# Patient Record
Sex: FEMALE | Race: ASIAN | NOT HISPANIC OR LATINO | Employment: FULL TIME | URBAN - METROPOLITAN AREA
[De-identification: names, ages, dates, MRNs, and addresses within clinical notes are randomized per-mention and may not be internally consistent; named-entity substitution may affect disease eponyms.]

---

## 2017-03-24 ENCOUNTER — ALLSCRIPTS OFFICE VISIT (OUTPATIENT)
Dept: OTHER | Facility: OTHER | Age: 37
End: 2017-03-24

## 2017-03-30 LAB
A/G RATIO (HISTORICAL): 1.2 (CALC) (ref 1–2.5)
ALBUMIN SERPL BCP-MCNC: 3.8 G/DL (ref 3.6–5.1)
ALP SERPL-CCNC: 109 U/L (ref 33–115)
ALT SERPL W P-5'-P-CCNC: 63 U/L (ref 6–29)
AST SERPL W P-5'-P-CCNC: 51 U/L (ref 10–30)
BILIRUB SERPL-MCNC: 0.6 MG/DL (ref 0.2–1.2)
BUN SERPL-MCNC: 10 MG/DL (ref 7–25)
BUN/CREA RATIO (HISTORICAL): 20 (CALC) (ref 6–22)
CALCIUM (ADJUSTED FOR ALBUMIN) (HISTORICAL): 9.3 MG/DL (CALC) (ref 8.6–10.2)
CALCIUM SERPL-MCNC: 8.9 MG/DL (ref 8.6–10.2)
CHLORIDE SERPL-SCNC: 102 MMOL/L (ref 98–110)
CHOLEST SERPL-MCNC: 249 MG/DL (ref 125–200)
CHOLEST/HDLC SERPL: 6.7 (CALC)
CO2 SERPL-SCNC: 25 MMOL/L (ref 20–31)
CREAT SERPL-MCNC: 0.49 MG/DL (ref 0.5–1.1)
EGFR AFRICAN AMERICAN (HISTORICAL): 146 ML/MIN/1.73M2
EGFR-AMERICAN CALC (HISTORICAL): 126 ML/MIN/1.73M2
EST. AVERAGE GLUCOSE BLD GHB EST-MCNC: 177 (CALC)
EST. AVERAGE GLUCOSE BLD GHB EST-MCNC: 9.8 (CALC)
GAMMA GLOBULIN (HISTORICAL): 3.3 G/DL (CALC) (ref 1.9–3.7)
GLUCOSE (HISTORICAL): 127 MG/DL (ref 65–99)
HBA1C MFR BLD HPLC: 7.8 % OF TOTAL HGB
HDLC SERPL-MCNC: 37 MG/DL
LDL CHOLESTEROL (HISTORICAL): 188 MG/DL (CALC)
NON-HDL-CHOL (CHOL-HDL) (HISTORICAL): 212 MG/DL (CALC)
POTASSIUM SERPL-SCNC: 4.1 MMOL/L (ref 3.5–5.3)
SODIUM SERPL-SCNC: 136 MMOL/L (ref 135–146)
TOTAL PROTEIN (HISTORICAL): 7.1 G/DL (ref 6.1–8.1)
TRIGL SERPL-MCNC: 120 MG/DL
TSH SERPL DL<=0.05 MIU/L-ACNC: 1.15 MIU/L

## 2017-04-02 ENCOUNTER — GENERIC CONVERSION - ENCOUNTER (OUTPATIENT)
Dept: OTHER | Facility: OTHER | Age: 37
End: 2017-04-02

## 2017-04-27 ENCOUNTER — ALLSCRIPTS OFFICE VISIT (OUTPATIENT)
Dept: OTHER | Facility: OTHER | Age: 37
End: 2017-04-27

## 2017-05-02 ENCOUNTER — GENERIC CONVERSION - ENCOUNTER (OUTPATIENT)
Dept: OTHER | Facility: OTHER | Age: 37
End: 2017-05-02

## 2017-05-02 LAB — CLINICAL COMMENT (HISTORICAL): NORMAL

## 2017-05-12 ENCOUNTER — LAB CONVERSION - ENCOUNTER (OUTPATIENT)
Dept: OTHER | Facility: OTHER | Age: 37
End: 2017-05-12

## 2017-05-12 LAB
17-HYDROXYPREGNENOLONE (HISTORICAL): 13 NG/DL
DHEA-SO4 (HISTORICAL): 188 MCG/DL (ref 23–266)
HBA1C MFR BLD HPLC: 6.3 % OF TOTAL HGB
PROLACTIN (HISTORICAL): 4.6 NG/ML
T4 FREE SERPL-MCNC: 1.9 NG/DL (ref 0.8–2.7)
T4 TOTAL (HISTORICAL): 9 MCG/DL (ref 4.8–10.4)
TESTOSTERONE FREE (HISTORICAL): 2.2 PG/ML (ref 0.1–6.4)
TESTOSTERONE TOTAL (HISTORICAL): 16 NG/DL (ref 2–45)
TSH SERPL DL<=0.05 MIU/L-ACNC: 0.95 MIU/L

## 2017-05-15 ENCOUNTER — GENERIC CONVERSION - ENCOUNTER (OUTPATIENT)
Dept: OTHER | Facility: OTHER | Age: 37
End: 2017-05-15

## 2017-06-23 ENCOUNTER — ALLSCRIPTS OFFICE VISIT (OUTPATIENT)
Dept: OTHER | Facility: OTHER | Age: 37
End: 2017-06-23

## 2017-07-17 ENCOUNTER — ALLSCRIPTS OFFICE VISIT (OUTPATIENT)
Dept: OTHER | Facility: OTHER | Age: 37
End: 2017-07-17

## 2017-09-15 ENCOUNTER — GENERIC CONVERSION - ENCOUNTER (OUTPATIENT)
Dept: OTHER | Facility: OTHER | Age: 37
End: 2017-09-15

## 2017-09-15 LAB
LEFT EYE DIABETIC RETINOPATHY: NORMAL
RIGHT EYE DIABETIC RETINOPATHY: NORMAL

## 2017-09-25 ENCOUNTER — ALLSCRIPTS OFFICE VISIT (OUTPATIENT)
Dept: OTHER | Facility: OTHER | Age: 37
End: 2017-09-25

## 2017-09-29 ENCOUNTER — LAB CONVERSION - ENCOUNTER (OUTPATIENT)
Dept: OTHER | Facility: OTHER | Age: 37
End: 2017-09-29

## 2017-09-29 LAB
A/G RATIO (HISTORICAL): 1.2 (CALC) (ref 1–2.5)
ALBUMIN SERPL BCP-MCNC: 4 G/DL (ref 3.6–5.1)
ALP SERPL-CCNC: 132 U/L (ref 33–115)
ALT SERPL W P-5'-P-CCNC: 32 U/L (ref 6–29)
AST SERPL W P-5'-P-CCNC: 25 U/L (ref 10–30)
BILIRUB SERPL-MCNC: 0.4 MG/DL (ref 0.2–1.2)
BUN SERPL-MCNC: 9 MG/DL (ref 7–25)
BUN/CREA RATIO (HISTORICAL): ABNORMAL (CALC) (ref 6–22)
CALCIUM (ADJUSTED FOR ALBUMIN) (HISTORICAL): 9.6 MG/DL (CALC) (ref 8.6–10.2)
CALCIUM SERPL-MCNC: 9.3 MG/DL (ref 8.6–10.2)
CHLORIDE SERPL-SCNC: 102 MMOL/L (ref 98–110)
CHOLEST SERPL-MCNC: 164 MG/DL
CHOLEST/HDLC SERPL: 4.4 (CALC)
CO2 SERPL-SCNC: 27 MMOL/L (ref 20–31)
CREAT SERPL-MCNC: 0.51 MG/DL (ref 0.5–1.1)
CREATININE, RANDOM URINE (HISTORICAL): 65 MG/DL (ref 20–320)
EGFR AFRICAN AMERICAN (HISTORICAL): 144 ML/MIN/1.73M2
EGFR-AMERICAN CALC (HISTORICAL): 125 ML/MIN/1.73M2
GAMMA GLOBULIN (HISTORICAL): 3.4 G/DL (CALC) (ref 1.9–3.7)
GLUCOSE (HISTORICAL): 98 MG/DL (ref 65–99)
HBA1C MFR BLD HPLC: 6.2 % OF TOTAL HGB
HDLC SERPL-MCNC: 37 MG/DL
LDL CHOLESTEROL (HISTORICAL): 105 MG/DL (CALC)
MAGNESIUM, UR (HISTORICAL): 0.2 MG/DL
MICROALBUMIN/CREATININE RATIO (HISTORICAL): 3 MCG/MG CREAT
NON-HDL-CHOL (CHOL-HDL) (HISTORICAL): 127 MG/DL (CALC)
POTASSIUM SERPL-SCNC: 4.7 MMOL/L (ref 3.5–5.3)
SODIUM SERPL-SCNC: 135 MMOL/L (ref 135–146)
TOTAL PROTEIN (HISTORICAL): 7.4 G/DL (ref 6.1–8.1)
TRIGL SERPL-MCNC: 122 MG/DL

## 2017-10-01 ENCOUNTER — GENERIC CONVERSION - ENCOUNTER (OUTPATIENT)
Dept: OTHER | Facility: OTHER | Age: 37
End: 2017-10-01

## 2018-01-11 NOTE — RESULT NOTES
Verified Results  (Q) THINPREP PAP AND HPV mRNA E6/E7 reflex HPV 16, 18/45 with CT/NG 27Apr2017 12:00AM Angelika Pompa     Test Name Result Flag Reference   SOURCE:      Cervix, Endocervix   CLINICAL INFORMATION:      28 Y/O AF G0   LMP:      [id number]   PREV  PAP:      NO H/O ABN PAP   PREV  BX:      NONE GIVEN   STATEMENT OF ADEQUACY:      Satisfactory for evaluation  Endocervical/transformation zone component  present  INTERPRETATION/RESULT:      Negative for intraepithelial lesion or malignancy  COMMENT:      This Pap test has been evaluated with computer  assisted technology  CYTOTECHNOLOGIST:      MELYSSA STYLES(ASCP)  CT screening location: [address] [place] [address]   HPV mRNA E6/E7 Not Detected  Not Detected   This test was performed using the APTIMA HPV Assay ([employer] )  This assay detects E6/E7 viral messenger RNA (mRNA) from 14  high-risk HPV types (16,18,31,33,35,39,45,51,52,56,58,59,66,68)  CHLAMYDIA TRACHOMATIS$RNA, TMA NOT DETECTED  NOT DETECTED   NEISSERIA GONORRHOEAE$RNA, TMA NOT DETECTED  NOT DETECTED   This test was performed using the APTIMA COMBO2 Assay  ([employer] )  The analytical performance characteristics of this   assay, when used to test [facility] specimens have  been determined by [facility]

## 2018-01-11 NOTE — RESULT NOTES
Verified Results  (Q) 17 HYDROXYPROGESTERONE, LC/MS/MS [id number] 07:22AM Jeffrey Main     Test Name Result Flag Reference   17 HYDROXYPROGESTERONE,$LC/MS/MS 13 ng/dL  see note   *** Unable to flag abnormal result(s), please refer      to reference range(s) below:     Adult Female Reference Ranges    for 17-Hydroxyprogesterone, LC/MS/MS:         Follicular Phase:   491 ng/dL or less          Luteal Phase:   285 ng/dL or less        Postmenopausal:    45 ng/dL or less             Pregnancy:       First Trimester:   78 - 457 ng/dL      Second Trimester:   90 - 357 ng/dL       Third Trimester:  144 - 578 ng/dL        This test was developed and its analytical performance  characteristics have been determined by [address] [place] [address]  It has  not been cleared or approved by the [place]  Food and Drug  Administration  This assay has been validated pursuant  to the CLIA regulations and is used for clinical  purposes  (Q) HEMOGLOBIN A1c W/REFL TO BVWBAGHMW(O) [id number] 07:22AM Jeffrey Main   REPORT COMMENT:  FASTING:NO     Test Name Result Flag Reference   HEMOGLOBIN A1c 6 3 % of total Hgb H <5 7   For someone without known diabetes, a hemoglobin   A1c value between 5 7% and 6 4% is consistent with  prediabetes and should be confirmed with a   follow-up test      For someone with known diabetes, a value <7%  indicates that their diabetes is well controlled  A1c  targets should be individualized based on duration of  diabetes, age, comorbid conditions, and other  considerations  This assay result is consistent with an increased risk  of diabetes  Currently, no consensus exists regarding use of  hemoglobin A1c for diagnosis of diabetes for children       (Q) T4, FREE, DIRECT DIALYSIS AND T4, TOTAL [id number] 07:22AM Jeffrey Main     Test Name Result Flag Reference   T4, FREE, DIRECT DIALYSIS 1 9 ng/dL  0 8-2 7   Pregnancy Reference Ranges for T4, Free, Direct   Dialysis: First Trimester:   0 9-2 0 ng/dL  Second Trimester:  0 8-1 5 ng/dL  Third Trimester:   0 8-1 7 ng/dL     This test was developed and its analytical performance  characteristics have been determined by [facility]  It has not been  cleared or approved by FDA  This assay has been validated  pursuant to the CLIA regulations and is used for clinical  purposes  T4, TOTAL 9 0 mcg/dL  4 8-10 4   <1 month: Not Established       1-23 months: 6 0-13 2 mcg/dL        2-12 years: 5 5-12 1 mcg/dL       13-20 years: 5 5-11 1 mcg/dL         >20 years: 4 8-10 4 mcg/dL       Pregnancy     1st Trimester: 6 4-15 2 mcg/dL     2nd Trimester: 7 4-15 2 mcg/dL     3rd Trimester: 7 7-13 8 mcg/dL     All Trimesters          Together: 7 0-14 7 mcg/dL     Conversion factor: 1 mcg/dL = 12 9 nmol/L     (Q) TESTOSTERONE, FREE AND TOTAL, LC/MS/MS [id number] 07:22AM [employer]     Test Name Result Flag Reference   TESTOSTERONE, TOTAL,$LC/MS/MS 16 ng/dL  2-45   For more information on this test, go to  http://[employer]/faq/  TotalTestosteroneLCMSMS        This test was developed and its analytical performance  characteristics have been determined by [address] [place] [address]  It has  not been cleared or approved by the [place]  Food and Drug  Administration  This assay has been validated pursuant  to the CLIA regulations and is used for clinical  purposes  FREE TESTOSTERONE 2 2 pg/mL  0 1-6 4   This test was developed and its analytical performance  characteristics have been determined by [address] [place] [address]  It has  not been cleared or approved by the [place]  Food and Drug  Administration  This assay has been validated pursuant  to the CLIA regulations and is used for clinical  purposes       (Q) TSH, 3RD GENERATION [id number] 07:22AM StarlenCurTran     Test Name Result Flag Reference   TSH 0 95 mIU/L     Reference Range > or = 20 Years  0 40-4 50                              Pregnancy Ranges            First trimester    0 26-2 66            Second trimester   0 55-2 73            Third trimester    0 43-2 91     (Q) PROLACTIN [id number] 07:22AM Daya Abdiel     Test Name Result Flag Reference   PROLACTIN 4 6 ng/mL     Reference Range   Females          Non-pregnant        3 0-30 0          Pregnant           10 0-209 0          Postmenopausal      2 0-20 0     (1) DHEA-S [id number] 07:22AM Daya Abdiel     Test Name Result Flag Reference   DHEA SULFATE 188 mcg/dL

## 2018-01-12 VITALS
OXYGEN SATURATION: 98 % | TEMPERATURE: 99.5 F | RESPIRATION RATE: 17 BRPM | HEART RATE: 104 BPM | HEIGHT: 61 IN | BODY MASS INDEX: 34.78 KG/M2 | DIASTOLIC BLOOD PRESSURE: 84 MMHG | SYSTOLIC BLOOD PRESSURE: 136 MMHG | WEIGHT: 184.25 LBS

## 2018-01-13 VITALS
BODY MASS INDEX: 35.78 KG/M2 | HEIGHT: 60 IN | HEART RATE: 96 BPM | TEMPERATURE: 97.8 F | DIASTOLIC BLOOD PRESSURE: 90 MMHG | WEIGHT: 182.25 LBS | RESPIRATION RATE: 20 BRPM | SYSTOLIC BLOOD PRESSURE: 110 MMHG | OXYGEN SATURATION: 98 %

## 2018-01-13 VITALS
WEIGHT: 186.06 LBS | HEART RATE: 109 BPM | OXYGEN SATURATION: 97 % | DIASTOLIC BLOOD PRESSURE: 76 MMHG | HEIGHT: 61 IN | BODY MASS INDEX: 35.13 KG/M2 | SYSTOLIC BLOOD PRESSURE: 134 MMHG | RESPIRATION RATE: 16 BRPM | TEMPERATURE: 99.1 F

## 2018-01-13 VITALS
WEIGHT: 181 LBS | SYSTOLIC BLOOD PRESSURE: 106 MMHG | DIASTOLIC BLOOD PRESSURE: 68 MMHG | HEIGHT: 61 IN | BODY MASS INDEX: 34.17 KG/M2

## 2018-01-14 VITALS
RESPIRATION RATE: 16 BRPM | OXYGEN SATURATION: 98 % | HEART RATE: 105 BPM | HEIGHT: 61 IN | TEMPERATURE: 96.8 F | DIASTOLIC BLOOD PRESSURE: 80 MMHG | BODY MASS INDEX: 34.21 KG/M2 | SYSTOLIC BLOOD PRESSURE: 118 MMHG | WEIGHT: 181.19 LBS

## 2018-01-15 NOTE — RESULT NOTES
Message   Please call pt  Inform her that her recent US of her Liver only showed mild fatty changes  No other abnormalities were seen  thank you     Verified Results  [address] [id number] 05:06PM Velia Figures Order Number: [id number]    - Patient Instructions: To schedule this appointment, please contact Central Scheduling at [phone]  Test Name Result Flag Reference   US LIVER (Report)     RIGHT UPPER QUADRANT ULTRASOUND     INDICATION: Hyperlipidemia, unspecified  Elevated liver functions        COMPARISON: None  TECHNIQUE:  Real-time ultrasound of the right upper quadrant was performed with a curvilinear transducer with both volumetric sweeps and still imaging techniques  FINDINGS:     PANCREAS: Portions of the pancreas are obscured by bowel gas  Visualized portions of the pancreas are unremarkable  AORTA AND IVC: Visualized portions are normal for patient age  LIVER:   Size: Within normal range  The liver measures 16 7 cm in the midclavicular line  Contour: Surface contour is smooth  Parenchyma: There is mild diffuse increased echogenicity with smooth echotexture, without significant beam attenuation or loss of periportal echogenicity  Most consistent with mild hepatic steatosis  No evidence of suspicious mass  The main portal vein is patent and hepatopetal       BILIARY:   The gallbladder is normal in caliber  No wall thickening or pericholecystic fluid  No stones or sludge identified  No sonographic Moreau's sign  No intrahepatic biliary dilatation  CBD measures 3 mm  No choledocholithiasis  KIDNEY:    Right kidney measures 12 0 x 5 4 cm  Within normal limits  ASCITES:  None  IMPRESSION:   Mild fatty infiltration of the liver         Workstation performed: [id number]     Signed by:   Concepcion Gibson DO   9/28/16

## 2018-01-16 NOTE — RESULT NOTES
Message   Please call patient, her recent bloodwork showed an elevated A1 C level(Measure of Diabetic control) And persistently elevated cholesterol levels  At this time, due to her persistent elevations, she may need to start an additional diabetic medication  She will need to increase her dose of metformin to 1000mg BID  I would also like to start [address] [facility] for her  Please check to see if she is still taking atorvastatin for her cholesterol  This is on or just continuous  Is there a reason why she stop this  Thank you     Verified Results  (Q) COMPREHENSIVE METABOLIC PNL W/ADJUSTED CALCIUM [id number] 01:19PM Elex Neyda     Test Name Result Flag Reference   GLUCOSE 127 mg/dL H 65-99   Fasting reference interval   UREA NITROGEN (BUN) 10 mg/dL  7-25   CREATININE 0 49 mg/dL L 0 50-1 10   eGFR NON-AFR  AMERICAN 126 mL/min/1 73m2  > OR = 60   eGFR AFRICAN AMERICAN 146 mL/min/1 73m2  > OR = 60   BUN/CREATININE RATIO 20 (calc)  6-22   SODIUM 136 mmol/L  135-146   POTASSIUM 4 1 mmol/L  3 5-5 3   CHLORIDE 102 mmol/L     CARBON DIOXIDE 25 mmol/L  20-31   CALCIUM 8 9 mg/dL  8 6-10 2   CALCIUM (ADJUSTED FOR$ALBUMIN) 9 3 mg/dL (calc)  8 6-10 2   PROTEIN, TOTAL 7 1 g/dL  6 1-8 1   ALBUMIN 3 8 g/dL  3 6-5 1   GLOBULIN 3 3 g/dL (calc)  1 9-3 7   ALBUMIN/GLOBULIN RATIO 1 2 (calc)  1 0-2 5   BILIRUBIN, TOTAL 0 6 mg/dL  0 2-1 2   ALKALINE PHOSPHATASE 109 U/L     AST 51 U/L H 10-30   ALT 63 U/L H 6-29     (Q) HEMOGLOBIN A1c WITH eAG [id number] 01:19PM Ronni Devi   REPORT COMMENT:  FASTING:YES     Test Name Result Flag Reference   HEMOGLOBIN A1c 7 8 % of total Hgb H <5 7   For someone without known diabetes, a hemoglobin A1c  value of 6 5% or greater indicates that they may have   diabetes and this should be confirmed with a follow-up   test      For someone with known diabetes, a value <7% indicates   that their diabetes is well controlled and a value   greater than or equal to 7% indicates suboptimal   control   A1c targets should be individualized based on   duration of diabetes, age, comorbid conditions, and   other considerations  Currently, no consensus exists regarding use of  hemoglobin A1c for diagnosis of diabetes for children  eAG (mg/dL) 177 (calc)     eAG (mmol/L) 9 8 (calc)       (Q) LIPID PANEL WITH REFLEX TO DIRECT LDL [id number] 01:19PM Ronni Devi     Test Name Result Flag Reference   CHOLESTEROL, TOTAL 249 mg/dL H 125-200   HDL CHOLESTEROL 37 mg/dL L > OR = 46   TRIGLICERIDES 293 mg/dL  <150   LDL-CHOLESTEROL 188 mg/dL (calc) H <130   Desirable range <100 mg/dL for patients with CHD or  diabetes and <70 mg/dL for diabetic patients with  known heart disease  CHOL/HDLC RATIO 6 7 (calc) H < OR = 5 0   NON HDL CHOLESTEROL 212 mg/dL (calc) H    Target for non-HDL cholesterol is 30 mg/dL higher than   LDL cholesterol target  (Q) TSH, 3RD GENERATION W/REFLEX TO FT4 [id number] 01:19PM Ronni Devi     Test Name Result Flag Reference   TSH W/REFLEX TO FT4 1 15 mIU/L     Reference Range                         > or = 20 Years  0 40-4 50                              Pregnancy Ranges            First trimester    0 26-2 66            Second trimester   0 55-2 73            Third trimester    0 43-2 91       Plan  Type 2 diabetes mellitus, uncontrolled    · Januvia 100 MG Oral Tablet;  Take 1 tablet daily   · From  MetFORMIN HCl - 500 MG Oral Tablet Take 1 tablet twice daily To  MetFORMIN HCl - 1000 MG Oral Tablet Take 1 tablet twice daily

## 2018-01-16 NOTE — RESULT NOTES
Verified Results  (Q) COMPREHENSIVE METABOLIC PNL W/ADJUSTED CALCIUM 28Sep2017 07:21AM Ronni Devi     Test Name Result Flag Reference   GLUCOSE 98 mg/dL  65-99   Fasting reference interval   UREA NITROGEN (BUN) 9 mg/dL  7-25   CREATININE 0 51 mg/dL  0 50-1 10   eGFR NON-AFR  AMERICAN 125 mL/min/1 73m2  > OR = 60   eGFR AFRICAN AMERICAN 144 mL/min/1 73m2  > OR = 60   BUN/CREATININE RATIO   2-66   NOT APPLICABLE (calc)   SODIUM 135 mmol/L  135-146   POTASSIUM 4 7 mmol/L  3 5-5 3   CHLORIDE 102 mmol/L     CARBON DIOXIDE 27 mmol/L  20-31   CALCIUM 9 3 mg/dL  8 6-10 2   CALCIUM (ADJUSTED FOR$ALBUMIN) 9 6 mg/dL (calc)  8 6-10 2   PROTEIN, TOTAL 7 4 g/dL  6 1-8 1   ALBUMIN 4 0 g/dL  3 6-5 1   GLOBULIN 3 4 g/dL (calc)  1 9-3 7   ALBUMIN/GLOBULIN RATIO 1 2 (calc)  1 0-2 5   BILIRUBIN, TOTAL 0 4 mg/dL  0 2-1 2   ALKALINE PHOSPHATASE 132 U/L H    AST 25 U/L  10-30   ALT 32 U/L H 6-29     (Q) HEMOGLOBIN A1c W/REFL TO [id number](Y) 28Sep2017 07:21AM Ronni Devi   REPORT COMMENT:  FASTING:YES     Test Name Result Flag Reference   HEMOGLOBIN A1c 6 2 % of total Hgb H <5 7   For someone without known diabetes, a hemoglobin   A1c value between 5 7% and 6 4% is consistent with  prediabetes and should be confirmed with a   follow-up test      For someone with known diabetes, a value <7%  indicates that their diabetes is well controlled  A1c  targets should be individualized based on duration of  diabetes, age, comorbid conditions, and other  considerations  This assay result is consistent with an increased risk  of diabetes  Currently, no consensus exists regarding use of  hemoglobin A1c for diagnosis of diabetes for children       (Q) LIPID PANEL WITH REFLEX TO DIRECT LDL 28Sep2017 07:21AM Ronni Devi     Test Name Result Flag Reference   CHOLESTEROL, TOTAL 164 mg/dL  <200   HDL CHOLESTEROL 37 mg/dL L >91   TRIGLICERIDES 153 mg/dL  <150   LDL-CHOLESTEROL 105 mg/dL (calc) H    Reference range: <100 Desirable range <100 mg/dL for patients with CHD or  diabetes and <70 mg/dL for diabetic patients with  known heart disease  LDL-C is now calculated using the Omkar-Campuzano   calculation, which is a validated novel method providing   better accuracy than the Friedewald equation in the   estimation of LDL-C  Valarie Hernández  Nadia Prajapati  6712;717(92): [phone]   (http://[employer]/faq/LKB560)   CHOL/HDLC RATIO 4 4 (calc)  <5 0   NON HDL CHOLESTEROL 127 mg/dL (calc)  <130   For patients with diabetes plus 1 major ASCVD risk   factor, treating to a non-HDL-C goal of <100 mg/dL   (LDL-C of <70 mg/dL) is considered a therapeutic   option  (Q) MICROALBUMIN, RANDOM URINE (W/CREATININE) [id number] 07:21AM Ronni Devi     Test Name Result Flag Reference   CREATININE, RANDOM URINE 65 mg/dL     MICROALBUMIN 0 2 mg/dL     Reference Range  Not established   MICROALBUMIN/CREATININE$RATIO, RANDOM URINE 3 mcg/mg creat  <30   The ADA defines abnormalities in albumin  excretion as follows:     Category         Result (mcg/mg creatinine)     Normal                    <30  Microalbuminuria            Clinical albuminuria   > OR = 300     The ADA recommends that at least two of three  specimens collected within a 3-6 month period be  abnormal before considering a patient to be  within a diagnostic category

## 2018-01-18 NOTE — RESULT NOTES
Message   Please call patient, her bloodwork appears minimally improved  At this time,Please have patient follow up with me in September to review this further and to possibly recheck further blood work  Thank you     Verified Results  (1) COMPREHENSIVE METABOLIC PANEL [id number] 44:41PW Ronni Devi     Test Name Result Flag Reference   GLUCOSE,RANDM 210 mg/dL H    If the patient is fasting, the ADA then defines impaired fasting glucose as > 100 mg/dL and diabetes as > or equal to 123 mg/dL  SODIUM 135 mmol/L L 136-145   POTASSIUM 4 8 mmol/L  3 5-5 3   CHLORIDE 101 mmol/L  100-108   CARBON DIOXIDE 28 mmol/L  21-32   ANION GAP (CALC) 6 mmol/L  4-13   BLOOD UREA NITROGEN 4 mg/dL L 5-25   CREATININE 0 53 mg/dL L 0 60-1 30   Standardized to IDMS reference method   CALCIUM 9 5 mg/dL  8 3-10 1   BILI, TOTAL 0 50 mg/dL  0 20-1 00   ALK PHOSPHATAS 157 U/L H    ALT (SGPT) 170 U/L H 12-78   AST(SGOT) 131 U/L H 5-45   ALBUMIN 3 4 g/dL L 3 5-5 0   TOTAL PROTEIN 8 2 g/dL  6 4-8 2   eGFR Non-African American      >60 0 ml/min/1 73sq [facility] Disease [facility] recommendations are as follows:  GFR calculation is accurate only with a steady state creatinine  Chronic Kidney disease less than 60 ml/min/1 73 sq  meters  Kidney failure less than 15 ml/min/1 73 sq  meters       (1) HEPATIC FUNCTION PANEL [id number] 09:56AM Glenny Brianne     Test Name Result Flag Reference   BILI, DIRECT 0 14 mg/dL  0 00-0 20

## 2018-01-24 PROBLEM — M25.532 BILATERAL WRIST PAIN: Status: ACTIVE | Noted: 2017-06-24

## 2018-01-24 PROBLEM — N91.5 OLIGOMENORRHEA: Status: ACTIVE | Noted: 2017-04-27

## 2018-01-24 PROBLEM — E58 DIETARY CALCIUM DEFICIENCY: Status: ACTIVE | Noted: 2017-04-27

## 2018-01-24 PROBLEM — E66.9 OBESITY, CLASS II, BMI 35-39.9: Status: ACTIVE | Noted: 2017-04-27

## 2018-01-24 PROBLEM — M25.531 BILATERAL WRIST PAIN: Status: ACTIVE | Noted: 2017-06-24

## 2018-01-24 RX ORDER — BLOOD-GLUCOSE METER
EACH MISCELLANEOUS DAILY
COMMUNITY
Start: 2017-04-21

## 2018-01-24 RX ORDER — ATORVASTATIN CALCIUM 10 MG/1
1 TABLET, FILM COATED ORAL
COMMUNITY
Start: 2016-12-22 | End: 2018-01-25 | Stop reason: SDUPTHER

## 2018-01-24 RX ORDER — BLOOD-GLUCOSE METER
KIT MISCELLANEOUS
COMMUNITY
Start: 2017-04-20

## 2018-01-24 RX ORDER — LANCETS
EACH MISCELLANEOUS DAILY
COMMUNITY
Start: 2017-04-21

## 2018-01-24 RX ORDER — LANCETS
EACH MISCELLANEOUS DAILY
COMMUNITY
Start: 2017-04-20

## 2018-01-24 RX ORDER — NAPROXEN 500 MG/1
TABLET ORAL
COMMUNITY
Start: 2017-05-31

## 2018-01-25 ENCOUNTER — OFFICE VISIT (OUTPATIENT)
Dept: FAMILY MEDICINE CLINIC | Facility: CLINIC | Age: 38
End: 2018-01-25

## 2018-01-25 VITALS
OXYGEN SATURATION: 97 % | HEART RATE: 101 BPM | BODY MASS INDEX: 35.97 KG/M2 | SYSTOLIC BLOOD PRESSURE: 110 MMHG | TEMPERATURE: 98.4 F | WEIGHT: 184.2 LBS | DIASTOLIC BLOOD PRESSURE: 72 MMHG

## 2018-01-25 DIAGNOSIS — G47.33 OBSTRUCTIVE SLEEP APNEA: ICD-10-CM

## 2018-01-25 DIAGNOSIS — IMO0001 UNCONTROLLED TYPE 2 DIABETES MELLITUS WITHOUT COMPLICATION, WITHOUT LONG-TERM CURRENT USE OF INSULIN: ICD-10-CM

## 2018-01-25 DIAGNOSIS — E78.5 DYSLIPIDEMIA: Primary | ICD-10-CM

## 2018-01-25 DIAGNOSIS — R74.01 TRANSAMINITIS: ICD-10-CM

## 2018-01-25 LAB
ALBUMIN SERPL BCP-MCNC: 3.8 G/DL (ref 3.5–5)
ALP SERPL-CCNC: 145 U/L (ref 46–116)
ALT SERPL W P-5'-P-CCNC: 68 U/L (ref 12–78)
ANION GAP SERPL CALCULATED.3IONS-SCNC: 8 MMOL/L (ref 4–13)
AST SERPL W P-5'-P-CCNC: 44 U/L (ref 5–45)
BASOPHILS # BLD AUTO: 0.02 THOUSANDS/ΜL (ref 0–0.1)
BASOPHILS NFR BLD AUTO: 0 % (ref 0–1)
BILIRUB SERPL-MCNC: 0.64 MG/DL (ref 0.2–1)
BUN SERPL-MCNC: 11 MG/DL (ref 5–25)
CALCIUM SERPL-MCNC: 9.1 MG/DL (ref 8.3–10.1)
CHLORIDE SERPL-SCNC: 106 MMOL/L (ref 100–108)
CHOLEST SERPL-MCNC: 257 MG/DL (ref 50–200)
CO2 SERPL-SCNC: 24 MMOL/L (ref 21–32)
CREAT SERPL-MCNC: 0.41 MG/DL (ref 0.6–1.3)
EOSINOPHIL # BLD AUTO: 0.15 THOUSAND/ΜL (ref 0–0.61)
EOSINOPHIL NFR BLD AUTO: 1 % (ref 0–6)
ERYTHROCYTE [DISTWIDTH] IN BLOOD BY AUTOMATED COUNT: 13.3 % (ref 11.6–15.1)
GFR SERPL CREATININE-BSD FRML MDRD: 133 ML/MIN/1.73SQ M
GLUCOSE P FAST SERPL-MCNC: 121 MG/DL (ref 65–99)
HCT VFR BLD AUTO: 37.4 % (ref 34.8–46.1)
HDLC SERPL-MCNC: 40 MG/DL (ref 40–60)
HGB BLD-MCNC: 11.9 G/DL (ref 11.5–15.4)
LDLC SERPL CALC-MCNC: 188 MG/DL (ref 0–100)
LYMPHOCYTES # BLD AUTO: 3.28 THOUSANDS/ΜL (ref 0.6–4.47)
LYMPHOCYTES NFR BLD AUTO: 31 % (ref 14–44)
MCH RBC QN AUTO: 27.7 PG (ref 26.8–34.3)
MCHC RBC AUTO-ENTMCNC: 31.8 G/DL (ref 31.4–37.4)
MCV RBC AUTO: 87 FL (ref 82–98)
MONOCYTES # BLD AUTO: 0.64 THOUSAND/ΜL (ref 0.17–1.22)
MONOCYTES NFR BLD AUTO: 6 % (ref 4–12)
NEUTROPHILS # BLD AUTO: 6.38 THOUSANDS/ΜL (ref 1.85–7.62)
NEUTS SEG NFR BLD AUTO: 62 % (ref 43–75)
NRBC BLD AUTO-RTO: 0 /100 WBCS
PLATELET # BLD AUTO: 432 THOUSANDS/UL (ref 149–390)
PMV BLD AUTO: 9.6 FL (ref 8.9–12.7)
POTASSIUM SERPL-SCNC: 4.1 MMOL/L (ref 3.5–5.3)
PROT SERPL-MCNC: 8.1 G/DL (ref 6.4–8.2)
RBC # BLD AUTO: 4.3 MILLION/UL (ref 3.81–5.12)
SODIUM SERPL-SCNC: 138 MMOL/L (ref 136–145)
TRIGL SERPL-MCNC: 144 MG/DL
WBC # BLD AUTO: 10.49 THOUSAND/UL (ref 4.31–10.16)

## 2018-01-25 PROCEDURE — 83036 HEMOGLOBIN GLYCOSYLATED A1C: CPT | Performed by: FAMILY MEDICINE

## 2018-01-25 PROCEDURE — 84443 ASSAY THYROID STIM HORMONE: CPT | Performed by: FAMILY MEDICINE

## 2018-01-25 PROCEDURE — 82570 ASSAY OF URINE CREATININE: CPT | Performed by: FAMILY MEDICINE

## 2018-01-25 PROCEDURE — 80061 LIPID PANEL: CPT | Performed by: FAMILY MEDICINE

## 2018-01-25 PROCEDURE — 82043 UR ALBUMIN QUANTITATIVE: CPT | Performed by: FAMILY MEDICINE

## 2018-01-25 PROCEDURE — 36415 COLL VENOUS BLD VENIPUNCTURE: CPT | Performed by: FAMILY MEDICINE

## 2018-01-25 PROCEDURE — 99214 OFFICE O/P EST MOD 30 MIN: CPT | Performed by: FAMILY MEDICINE

## 2018-01-25 PROCEDURE — 85025 COMPLETE CBC W/AUTO DIFF WBC: CPT | Performed by: FAMILY MEDICINE

## 2018-01-25 PROCEDURE — 80053 COMPREHEN METABOLIC PANEL: CPT | Performed by: FAMILY MEDICINE

## 2018-01-25 RX ORDER — FLUTICASONE PROPIONATE 50 MCG
2 SPRAY, SUSPENSION (ML) NASAL
COMMUNITY

## 2018-01-25 RX ORDER — ATORVASTATIN CALCIUM 10 MG/1
10 TABLET, FILM COATED ORAL
Qty: 90 TABLET | Refills: 1 | Status: SHIPPED | OUTPATIENT
Start: 2018-01-25

## 2018-01-25 NOTE — PROGRESS NOTES
Diabetic Foot Exam    Right Foot/Ankle   Right Foot Inspection  Skin Exam: skin normal and skin intact no dry skin, no warmth, no callus, no erythema, no maceration, no abnormal color, no pre-ulcer, no ulcer and no callus                          Toe Exam: ROM and strength within normal limits  Sensory       Monofilament testing: intact  Vascular    The right DP pulse is 2+  The right PT pulse is 2+  Left Foot/Ankle  Left Foot Inspection  Skin Exam: skin normalno dry skin, no warmth, no erythema, no maceration, normal color, no pre-ulcer, no ulcer and no callus                         Toe Exam: ROM and strength within normal limits                   Sensory       Monofilament: intact  Vascular    The left DP pulse is 2+  The left PT pulse is 2+     Assign Risk Category:  No deformity present; ;        Risk: 0

## 2018-01-25 NOTE — PROGRESS NOTES
Assessment/Plan:    No problem-specific Assessment & Plan notes found for this encounter  Diagnoses and all orders for this visit:    Uncontrolled type 2 diabetes mellitus without complication, without long-term current use of insulin (HCC)    Dyslipidemia    Transaminitis    Obstructive sleep apnea    Other orders  -     atorvastatin (LIPITOR) 10 mg tablet; Take 1 tablet by mouth daily at bedtime  -     glucose blood (ACCU-CHEK CHRISTINA PLUS) test strip; by In Vitro route daily  -     ACCU-CHEK FASTCLIX LANCETS MISC; by Does not apply route daily  -     Blood Glucose Monitoring Suppl (ACCU-CHEK CHRISTINA PLUS) w/Device KIT; by Does not apply route daily  -     Secukinumab 150 MG/ML SOAJ; Inject under the skin  -     sitaGLIPtin (JANUVIA) 100 mg tablet; Take 1 tablet by mouth daily  -     metFORMIN (GLUCOPHAGE) 1000 MG tablet; Take 1 tablet by mouth 2 (two) times a day  -     glucose blood (ONE TOUCH ULTRA TEST) test strip; by In Vitro route  -     naproxen (NAPROSYN) 500 mg tablet; Take by mouth  -     Blood Glucose Monitoring Suppl (ONE TOUCH ULTRA MINI) w/Device KIT; by Does not apply route  -     Lancets (ONETOUCH ULTRASOFT) lancets; by Does not apply route daily  -     atorvastatin (LIPITOR) 10 mg tablet; Take 1 tablet by mouth daily at bedtime          Subjective:      Patient ID: Gisselle Tran is a 40 y o  female  Patient is a 78-year-old female presents today for follow-up on her chronic conditions  She has type 2 obstructive sleep apnea as well as transaminitis  She states that she has been taking her medications regularly  She does check her blood sugar regularly  Has been noticing fasting sugars into the 120s  Denies any adverse reactions with any of her medications    She does follow up regularly with her rheumatologist         The following portions of the patient's history were reviewed and updated as appropriate: allergies, current medications, past family history, past medical history, past social history, past surgical history and problem list     Review of Systems   Constitutional: Negative for activity change, chills, fatigue and fever  HENT: Negative for congestion, ear pain, sinus pressure and sore throat  Eyes: Negative for redness, itching and visual disturbance  Respiratory: Negative for cough and shortness of breath  Cardiovascular: Negative for chest pain and palpitations  Gastrointestinal: Negative for abdominal pain, diarrhea and nausea  Endocrine: Negative for cold intolerance and heat intolerance  Genitourinary: Negative for dysuria, flank pain and frequency  Musculoskeletal: Negative for arthralgias, back pain, gait problem and myalgias  Skin: Negative for color change  Allergic/Immunologic: Negative for environmental allergies  Neurological: Negative for dizziness, numbness and headaches  Psychiatric/Behavioral: Negative for behavioral problems and sleep disturbance  Objective:     Physical Exam   Constitutional: She is oriented to person, place, and time  She appears well-developed and well-nourished  HENT:   Head: Normocephalic and atraumatic  Nose: Nose normal    Mouth/Throat: No oropharyngeal exudate  Eyes: Pupils are equal, round, and reactive to light  Right eye exhibits no discharge  Left eye exhibits no discharge  Neck: Normal range of motion  Neck supple  No tracheal deviation present  Cardiovascular: Normal rate, regular rhythm and intact distal pulses  Exam reveals no gallop and no friction rub  No murmur heard  Pulses:       Dorsalis pedis pulses are 2+ on the right side, and 2+ on the left side  Posterior tibial pulses are 2+ on the right side, and 2+ on the left side  Pulmonary/Chest: No respiratory distress  She has no wheezes  She has no rales  Abdominal: She exhibits no distension  There is no tenderness  There is no rebound and no guarding  Musculoskeletal: Normal range of motion  She exhibits no edema  Lymphadenopathy:        Head (right side): No submental and no submandibular adenopathy present  Head (left side): No submental and no submandibular adenopathy present  She has no cervical adenopathy  Right cervical: No superficial cervical, no deep cervical and no posterior cervical adenopathy present  Left cervical: No superficial cervical, no deep cervical and no posterior cervical adenopathy present  Neurological: She is alert and oriented to person, place, and time  No cranial nerve deficit or sensory deficit  Skin: Skin is warm, dry and intact  Psychiatric: Her speech is normal and behavior is normal  Judgment normal  Her mood appears not anxious  Cognition and memory are normal  She does not exhibit a depressed mood

## 2018-01-26 LAB
CREAT UR-MCNC: 189 MG/DL
EST. AVERAGE GLUCOSE BLD GHB EST-MCNC: 160 MG/DL
HBA1C MFR BLD: 7.2 % (ref 4.2–6.3)
MICROALBUMIN UR-MCNC: 12.8 MG/L (ref 0–20)
MICROALBUMIN/CREAT 24H UR: 7 MG/G CREATININE (ref 0–30)
TSH SERPL DL<=0.05 MIU/L-ACNC: 0.95 UIU/ML (ref 0.36–3.74)

## 2018-12-27 ENCOUNTER — TELEPHONE (OUTPATIENT)
Dept: FAMILY MEDICINE CLINIC | Facility: CLINIC | Age: 38
End: 2018-12-27

## 2018-12-27 NOTE — TELEPHONE ENCOUNTER
----- Message from Raj Arenas sent at 12/24/2018  9:28 AM EST -----  Regarding: ER Follow up   Pt was seen yesterday at  ED 12/23/2018 for Abscess of left external ear please call pt to schedule a follow up appointment

## 2018-12-27 NOTE — TELEPHONE ENCOUNTER
I attempted to call Sarath Marquez on her cell phone and unfortunately it went to voicemail and the mail box has to yet been set up  I did not leave a voicemail

## 2018-12-28 NOTE — TELEPHONE ENCOUNTER
I called Rena's cell phone  and it was not patient phone number belongs to a new individual no longer actual patient I ddi apologize to the recipient for the inconvenience  I did call and leave a message for Mateus Booker on her home phone requesting she please return the office's phone call

## 2018-12-31 NOTE — TELEPHONE ENCOUNTER
I called and left a  Message for Jason Leyden requesting she return the office's phone call  No cc on file no details were left

## 2019-06-20 ENCOUNTER — TELEPHONE (OUTPATIENT)
Dept: FAMILY MEDICINE CLINIC | Facility: CLINIC | Age: 39
End: 2019-06-20